# Patient Record
Sex: MALE | Race: BLACK OR AFRICAN AMERICAN | NOT HISPANIC OR LATINO | Employment: FULL TIME | URBAN - METROPOLITAN AREA
[De-identification: names, ages, dates, MRNs, and addresses within clinical notes are randomized per-mention and may not be internally consistent; named-entity substitution may affect disease eponyms.]

---

## 2024-03-28 ENCOUNTER — OFFICE VISIT (OUTPATIENT)
Dept: URGENT CARE | Facility: CLINIC | Age: 29
End: 2024-03-28
Payer: COMMERCIAL

## 2024-03-28 VITALS
HEART RATE: 66 BPM | WEIGHT: 190.69 LBS | OXYGEN SATURATION: 98 % | HEIGHT: 67 IN | DIASTOLIC BLOOD PRESSURE: 71 MMHG | SYSTOLIC BLOOD PRESSURE: 123 MMHG | TEMPERATURE: 99 F | RESPIRATION RATE: 17 BRPM | BODY MASS INDEX: 29.93 KG/M2

## 2024-03-28 DIAGNOSIS — R09.81 NASAL CONGESTION: ICD-10-CM

## 2024-03-28 DIAGNOSIS — R05.9 COUGH, UNSPECIFIED TYPE: ICD-10-CM

## 2024-03-28 DIAGNOSIS — U07.1 COVID-19: Primary | ICD-10-CM

## 2024-03-28 LAB
CTP QC/QA: YES
CTP QC/QA: YES
POC MOLECULAR INFLUENZA A AGN: NEGATIVE
POC MOLECULAR INFLUENZA B AGN: NEGATIVE
SARS-COV-2 AG RESP QL IA.RAPID: POSITIVE

## 2024-03-28 PROCEDURE — 87811 SARS-COV-2 COVID19 W/OPTIC: CPT | Mod: QW,S$GLB,, | Performed by: NURSE PRACTITIONER

## 2024-03-28 PROCEDURE — 99204 OFFICE O/P NEW MOD 45 MIN: CPT | Mod: S$GLB,,, | Performed by: NURSE PRACTITIONER

## 2024-03-28 PROCEDURE — 87502 INFLUENZA DNA AMP PROBE: CPT | Mod: QW,S$GLB,, | Performed by: NURSE PRACTITIONER

## 2024-03-28 RX ORDER — AMOXICILLIN 500 MG/1
500 CAPSULE ORAL 3 TIMES DAILY
COMMUNITY
Start: 2024-03-06 | End: 2024-03-28 | Stop reason: ALTCHOICE

## 2024-03-28 RX ORDER — HYDROCODONE BITARTRATE AND ACETAMINOPHEN 7.5; 325 MG/1; MG/1
1 TABLET ORAL EVERY 6 HOURS PRN
COMMUNITY
Start: 2024-03-06 | End: 2024-03-28 | Stop reason: ALTCHOICE

## 2024-03-28 RX ORDER — BENZONATATE 200 MG/1
200 CAPSULE ORAL 3 TIMES DAILY PRN
Qty: 30 CAPSULE | Refills: 0 | Status: SHIPPED | OUTPATIENT
Start: 2024-03-28 | End: 2024-04-07

## 2024-03-28 RX ORDER — PROMETHAZINE HYDROCHLORIDE AND DEXTROMETHORPHAN HYDROBROMIDE 6.25; 15 MG/5ML; MG/5ML
5 SYRUP ORAL EVERY 8 HOURS PRN
Qty: 180 ML | Refills: 0 | Status: SHIPPED | OUTPATIENT
Start: 2024-03-28

## 2024-03-28 NOTE — PROGRESS NOTES
"Subjective:      Patient ID: Pramod Mendoza is a 28 y.o. male.    Vitals:  height is 5' 7" (1.702 m) and weight is 86.5 kg (190 lb 11.2 oz). His oral temperature is 98.8 °F (37.1 °C). His blood pressure is 123/71 and his pulse is 66. His respiration is 17 and oxygen saturation is 98%.     Chief Complaint: Sinus Problem    Pt is a 27 yo male who presents today w/ nasal congestion accompanied w/ productive cough (yellow sputum) and sore throat for about 2 days. Pt c/o body aches, chills, diarrhea, and body aches. Pt denies fever and emesis. Pt has hx adolescent asthma.  Pt tried ibuprofen with some relief and Delsym with no relief. Denies known sick contacts. Pt is vaccinated.      Sinus Problem  This is a new problem. The current episode started in the past 7 days. The problem is unchanged. There has been no fever. His pain is at a severity of 6/10. The pain is moderate. Associated symptoms include chills, congestion, coughing, headaches and a sore throat. Pertinent negatives include no diaphoresis, ear pain, hoarse voice, neck pain, shortness of breath, sinus pressure, sneezing or swollen glands. (Body aches , diarrhea ) Treatments tried: Ibuprofen, Delsyum. The treatment provided mild relief.       Constitution: Positive for chills. Negative for sweating.   HENT:  Positive for congestion and sore throat. Negative for ear pain and sinus pressure.    Neck: Negative for neck pain.   Respiratory:  Positive for cough. Negative for shortness of breath.    Allergic/Immunologic: Negative for sneezing.   Neurological:  Positive for headaches.      Objective:     Physical Exam   Constitutional: He is oriented to person, place, and time. He appears well-developed. He is cooperative.  Non-toxic appearance. He does not appear ill. No distress.   HENT:   Head: Normocephalic and atraumatic.   Ears:   Right Ear: Hearing, tympanic membrane, external ear and ear canal normal.   Left Ear: Hearing, tympanic membrane, external ear " and ear canal normal.   Nose: Mucosal edema present. No rhinorrhea or nasal deformity. No epistaxis. Right sinus exhibits no maxillary sinus tenderness and no frontal sinus tenderness. Left sinus exhibits no maxillary sinus tenderness and no frontal sinus tenderness.   Mouth/Throat: Uvula is midline and mucous membranes are normal. No trismus in the jaw. Normal dentition. No uvula swelling. Posterior oropharyngeal erythema present. No oropharyngeal exudate or posterior oropharyngeal edema.   Eyes: Conjunctivae and lids are normal. No scleral icterus.   Neck: Trachea normal and phonation normal. Neck supple. No edema present. No erythema present. No neck rigidity present.   Cardiovascular: Normal rate, regular rhythm, normal heart sounds and normal pulses.   Pulmonary/Chest: Effort normal and breath sounds normal. No respiratory distress. He has no decreased breath sounds. He has no wheezes. He has no rhonchi.   Abdominal: Normal appearance.   Musculoskeletal: Normal range of motion.         General: No deformity. Normal range of motion.   Neurological: He is alert and oriented to person, place, and time. He exhibits normal muscle tone. Coordination normal.   Skin: Skin is warm, dry, intact, not diaphoretic and not pale.   Psychiatric: His speech is normal and behavior is normal. Judgment and thought content normal.   Nursing note and vitals reviewed.    Results for orders placed or performed in visit on 03/28/24   POCT Influenza A/B MOLECULAR   Result Value Ref Range    POC Molecular Influenza A Ag Negative Negative, Not Reported    POC Molecular Influenza B Ag Negative Negative, Not Reported     Acceptable Yes    SARS Coronavirus 2 Antigen, POCT Manual Read   Result Value Ref Range    SARS Coronavirus 2 Antigen Positive (A) Negative     Acceptable Yes      COVID risk score:  1    Assessment:     1. COVID-19    2. Nasal congestion    3. Cough, unspecified type        Plan:        COVID-19  -     nirmatrelvir-ritonavir 300 mg (150 mg x 2)-100 mg copackaged tablets (EUA); Take 3 tablets by mouth 2 (two) times daily for 5 days. Each dose contains 2 nirmatrelvir (pink tablets) and 1 ritonavir (white tablet). Take all 3 tablets together  Dispense: 30 tablet; Refill: 0    Nasal congestion  -     POCT Influenza A/B MOLECULAR  -     SARS Coronavirus 2 Antigen, POCT Manual Read    Cough, unspecified type  -     benzonatate (TESSALON) 200 MG capsule; Take 1 capsule (200 mg total) by mouth 3 (three) times daily as needed for Cough.  Dispense: 30 capsule; Refill: 0  -     promethazine-dextromethorphan (PROMETHAZINE-DM) 6.25-15 mg/5 mL Syrp; Take 5 mLs by mouth every 8 (eight) hours as needed (cough).  Dispense: 180 mL; Refill: 0      Patient Instructions   - You must understand that you have received an Urgent Care treatment only and that you may be released before all of your medical problems are known or treated.   - You, the patient, will arrange for follow up care as instructed.   - If your condition worsens or fails to improve we recommend that you receive another evaluation at the ER immediately or contact your PCP to discuss your concerns.   - You can call [phone] or [phone] to help schedule an appointment with the appropriate provider.    Drink plenty of fluids   Get lots of rest  Tylenol or ibuprofen for pain/fever  Mucinex DM for daytime cough  Prescription cough syrup for night time cough. This medication will make you drowsy. Do not drink alcohol or operate machinery while taking this medicine.   Saline nasal rinses to irrigate sinus cavities  Warm salt water gargles for sore throat    Isolation:   Stay home If you have a fever, continue to stay home until you are fever free for over 24 hours without the use of fever reducing medications.  Wear a mask for 5 days.  If you have no symptoms or your symptoms are resolving, you can leave your house

## 2024-03-28 NOTE — PATIENT INSTRUCTIONS
- You must understand that you have received an Urgent Care treatment only and that you may be released before all of your medical problems are known or treated.   - You, the patient, will arrange for follow up care as instructed.   - If your condition worsens or fails to improve we recommend that you receive another evaluation at the ER immediately or contact your PCP to discuss your concerns.   - You can call [phone] or [phone] to help schedule an appointment with the appropriate provider.    Drink plenty of fluids   Get lots of rest  Tylenol or ibuprofen for pain/fever  Mucinex DM for daytime cough  Prescription cough syrup for night time cough. This medication will make you drowsy. Do not drink alcohol or operate machinery while taking this medicine.   Saline nasal rinses to irrigate sinus cavities  Warm salt water gargles for sore throat    Isolation:   Stay home If you have a fever, continue to stay home until you are fever free for over 24 hours without the use of fever reducing medications.  Wear a mask for 5 days.  If you have no symptoms or your symptoms are resolving, you can leave your house

## 2024-03-28 NOTE — LETTER
[address] [place] [id number]  Phone: [phone]  Fax: [phone]          Return to Work/School    Patient: Pramod Mendoza  YOB: 1995   Date: 03/28/2024     To Whom It May Concern:     Pramod Mendoza was in contact with/seen in my office on 03/28/2024. COVID-19 is present in our communities across the [facility]. There is limited testing for COVID at this time, so not all patients can be tested. In this situation, your employee meets the following criteria:     Pramod Mendoza has met the criteria for COVID-19 testing and has a POSITIVE result. He can return to work once they are asymptomatic for 24 hours without the use of fever reducing medications AND at least five days from the start of symptoms (or from the first positive result if they have no symptoms).      If you have any questions or concerns, or if I can be of further assistance, please do not hesitate to contact me.     Sincerely,    Lorrie Gan NP

## 2024-03-28 NOTE — LETTER
[address] [place] [id number]  Phone: [phone]  Fax: [phone]          Return to Work/School    Patient: Pramod Mendoza  YOB: 1995   Date: 03/28/2024     To Whom It May Concern:     Pramod Mendoza was in contact with/seen in my office on 03/28/2024. COVID-19 is present in our communities across the state. There is limited testing for COVID at this time, so not all patients can be tested. In this situation, your employee meets the following criteria:     Pramod Mendoza has met the criteria for COVID-19 testing and has a POSITIVE result. He can return to work once they are asymptomatic for 24 hours without the use of fever reducing medications.      If you have any questions or concerns, or if I can be of further assistance, please do not hesitate to contact me.     Sincerely,    Lorrie Gan NP

## 2024-04-03 ENCOUNTER — OFFICE VISIT (OUTPATIENT)
Dept: URGENT CARE | Facility: CLINIC | Age: 29
End: 2024-04-03
Payer: COMMERCIAL

## 2024-04-03 VITALS
HEIGHT: 67 IN | BODY MASS INDEX: 29.82 KG/M2 | DIASTOLIC BLOOD PRESSURE: 71 MMHG | WEIGHT: 190 LBS | RESPIRATION RATE: 18 BRPM | TEMPERATURE: 99 F | OXYGEN SATURATION: 96 % | HEART RATE: 75 BPM | SYSTOLIC BLOOD PRESSURE: 127 MMHG

## 2024-04-03 DIAGNOSIS — A08.4 VIRAL GASTROENTERITIS: Primary | ICD-10-CM

## 2024-04-03 PROCEDURE — 99212 OFFICE O/P EST SF 10 MIN: CPT | Mod: S$GLB,,,

## 2024-04-03 NOTE — PROGRESS NOTES
"Subjective:      Patient ID: Pramod Mendoza is a 28 y.o. male.    Vitals:  height is 5' 7" (1.702 m) and weight is 86.2 kg (190 lb). His temperature is 98.5 °F (36.9 °C). His blood pressure is 127/71 and his pulse is 75. His respiration is 18 and oxygen saturation is 96%.     Chief Complaint: Cough    Pt presents complaints of a cough, headache, bodyaches and diarrhea 1 week ago. Pt has been taking tessalon pearls and promethazine dm for URI symptoms which have improved. In 1 day pt states about 4-5 times, watery stool, pt states it started to get better until yesterday evening. No abdominal pain. Pt stes he aslo vomited once since symptoms started. Pt reports diarrhea is now more formed. Symptoms exacerbated [place] from [place]     Diarrhea   This is a recurrent problem. The current episode started in the past 7 days. The problem occurs 2 to 4 times per day. The stool consistency is described as Watery. The patient states that diarrhea awakens him from sleep. Associated symptoms include coughing and vomiting. Pertinent negatives include no abdominal pain, arthralgias, bloating, chills, fever, headaches, increased  flatus, myalgias, sweats, URI or weight loss. Nothing aggravates the symptoms. There are no known risk factors. He has tried nothing for the symptoms.       Constitution: Negative for chills, fatigue and fever.   HENT:  Positive for postnasal drip. Negative for congestion.    Cardiovascular:  Negative for chest pain.   Respiratory:  Positive for cough. Negative for shortness of breath.    Gastrointestinal:  Positive for vomiting and diarrhea. Negative for abdominal pain and nausea.   Musculoskeletal:  Negative for joint pain and muscle ache.   Neurological:  Negative for headaches.      Objective:     Physical Exam   Constitutional: He is oriented to person, place, and time. He appears well-developed.   HENT:   Head: Normocephalic and atraumatic.   Ears:   Right Ear: External ear " normal.   Left Ear: External ear normal.   Nose: Nose normal. No rhinorrhea or congestion.   Mouth/Throat: Mucous membranes are normal. Posterior oropharyngeal erythema (mild) present. No oropharyngeal exudate.   Eyes: Conjunctivae and lids are normal.   Neck: Trachea normal. Neck supple.   Cardiovascular: Normal rate, regular rhythm and normal heart sounds.   Pulmonary/Chest: Effort normal and breath sounds normal. No stridor. No respiratory distress. He has no wheezes. He has no rhonchi. He has no rales.   Abdominal: Normal appearance and bowel sounds are normal. He exhibits no distension and no mass. Soft. There is abdominal tenderness in the right upper quadrant. There is no rebound and no guarding.   Musculoskeletal: Normal range of motion.         General: Normal range of motion.   Neurological: He is alert and oriented to person, place, and time. He has normal strength.   Skin: Skin is warm, dry, intact, not diaphoretic and not pale.   Psychiatric: His speech is normal and behavior is normal. Judgment and thought content normal.   Nursing note and vitals reviewed.      Assessment:     1. Viral gastroenteritis        Plan:       Viral gastroenteritis                Discussed results/diagnosis/plan with patient in clinic. Strict precautions given to patient to monitor for worsening signs and symptoms. Advised to follow up with PCP or specialist.  Explained side effects of medications prescribed with patient and informed him/her to discontinue use if he/she has any side effects and to inform UC or PCP if this occurs. All questions answered. Strict ED verses clinic return precautions stressed and given in depth. Advised if symptoms worsens of fail to improve he/she should go to the Emergency Room. Discharge and follow-up instructions given verbally/printed with the patient who expressed understanding and willingness to comply with my recommendations. Patient voiced understanding and in agreement with current  treatment plan. Patient exits the exam room in no acute distress. Conversant and engaged during discharge discussion, verbalized understanding.

## 2024-04-03 NOTE — PATIENT INSTRUCTIONS
Try a bland diet for the next few days. I included information on this in your discharge paperwork. Avoid acidic/spicy/processed foods as this will worsen your symptoms.     You can take over the counter pepto or imodium for symptoms but please understand that this may cause constipation.     Tylenol for pain. Avoid NSAIDs (ibuprofen/advil/aleve) which can cause abdominal pain.     Drink plenty of fluids---water and electrolyte replacement drinks like Pedialyte.    The recommended daily fluid intake for men is 3.7 liters per day (seven 16 oz bottles of water).    Wash hands frequently. Sanitize areas at home. Stay home if you are having fevers, chills, body aches, fatigue. Symptoms should resolve in in 7 days from symptom start. There is no specific treatment for viral illnesses. We treat symptoms with supportive care. Getting plenty of rest but completing light activities daily, drinking plenty of fluids, eating a nutritious diet, and managing stress levels can aid in faster recovery.      Follow up with PCP for ongoing symptoms in a few days.     Go to the ER if your abdominal pain becomes severe, unable to orally hydrate, chest pain, shortness of breath, palpitations, dizziness, large blood loss.

## 2025-03-18 ENCOUNTER — HOSPITAL ENCOUNTER (EMERGENCY)
Facility: OTHER | Age: 30
Discharge: HOME | End: 2025-03-19
Attending: EMERGENCY MEDICINE
Payer: COMMERCIAL

## 2025-03-18 DIAGNOSIS — R51.9 INTERMITTENT HEADACHE: Primary | ICD-10-CM

## 2025-03-18 LAB
HCV AB SERPL QL IA: NEGATIVE
HIV 1+2 AB+HIV1 P24 AG SERPL QL IA: NEGATIVE

## 2025-03-18 PROCEDURE — 99284 EMERGENCY DEPT VISIT MOD MDM: CPT | Mod: 25

## 2025-03-18 PROCEDURE — 86803 HEPATITIS C AB TEST: CPT | Performed by: EMERGENCY MEDICINE

## 2025-03-18 PROCEDURE — 87389 HIV-1 AG W/HIV-1&-2 AB AG IA: CPT | Performed by: EMERGENCY MEDICINE

## 2025-03-18 RX ORDER — BUTALBITAL, ACETAMINOPHEN AND CAFFEINE 50; 325; 40 MG/1; MG/1; MG/1
1 TABLET ORAL
Status: DISCONTINUED | OUTPATIENT
Start: 2025-03-18 | End: 2025-03-18

## 2025-03-19 VITALS
BODY MASS INDEX: 28.25 KG/M2 | WEIGHT: 180 LBS | RESPIRATION RATE: 17 BRPM | SYSTOLIC BLOOD PRESSURE: 122 MMHG | TEMPERATURE: 98 F | HEART RATE: 54 BPM | OXYGEN SATURATION: 100 % | DIASTOLIC BLOOD PRESSURE: 81 MMHG | HEIGHT: 67 IN

## 2025-03-19 NOTE — ED PROVIDER NOTES
Emergency Department Encounter  Provider Note    Pramod Mendoza  [id number]  3/18/2025    Evaluation:    History Acquisition:     Chief Complaint   Patient presents with    Headache     Patient brought to ER by mother for headache , patient states he is undiagnosed for migraines pain on scale 10 with photophobia , No medical history denies nausea denies vomiting         History of Present Illness:  Pramod Mendoza is a 29 y.o. male who has no past medical history on file.    The patient presents to the ED due to headaches.   Patient states he has had headaches all his life.   He states they typically occur once every few months.  He usually takes ibuprofen and they subside.  He has never gotten them formally evaluated, because he felt like they were not that severe.  However, he states over the last several months, he has had more frequent headaches.  Today he had a 10/10 headache associated with photophobia.  It did not respond to his normal dose of ibuprofen.  He took an 800 milligram ibuprofen around 17:00, and states by about 20:00 his headache had subsided.  He denies any associated vision changes, focal weakness/numbness, nausea/vomiting, neck/back pain, or any other associated symptoms.  He wears glasses regularly and had his eyes checked last year.    Additional historians utilized:  none    Prior medical records were reviewed:   Dentist visit 04/2024  Urgent care visit 04/2024 for viral gastroenteritis  Urgent care visit 03/2024 for COVID infection    The patient's list of active medical history, family/social history, medications, and allergies as documented has been reviewed.     History reviewed. No pertinent past medical history.  Past Surgical History:   Procedure Laterality Date    HERNIA REPAIR       No family history on file.  Social History     Socioeconomic History    Marital status: Single   Tobacco Use    Smoking status: Some Days   Substance and Sexual Activity    Alcohol use: No        Medications:  Medication List with Changes/Refills   Current Medications    IBUPROFEN (ADVIL,MOTRIN) 800 MG TABLET    Take 1 tablet (800 mg total) by mouth every 6 (six) hours as needed for Pain (take with food).    PROMETHAZINE-DEXTROMETHORPHAN (PROMETHAZINE-DM) 6.25-15 MG/5 ML SYRP    Take 5 mLs by mouth every 8 (eight) hours as needed (cough).       Allergies:  Review of patient's allergies indicates:   Allergen Reactions    Iodine and iodide containing products          Physical Exam:     Initial Vitals [03/18/25 1926]   BP Pulse Resp Temp SpO2   132/77 60 18 97.6 °F (36.4 °C) 100 %      MAP       --         Physical Exam    Nursing note and vitals reviewed.  Constitutional: He appears well-developed and well-nourished. He is not diaphoretic. No distress.   HENT:   Head: Normocephalic and atraumatic. Mouth/Throat: Oropharynx is clear and moist.   Eyes: EOM are normal. Pupils are equal, round, and reactive to light.   Neck: No tracheal deviation present.   Cardiovascular:  Normal rate, regular rhythm, normal heart sounds and intact distal pulses.           Pulmonary/Chest: Breath sounds normal. No stridor. No respiratory distress.   Abdominal: Abdomen is soft. He exhibits no distension and no mass. There is no abdominal tenderness.   Musculoskeletal:         General: No edema. Normal range of motion.     Neurological: He is alert and oriented to person, place, and time. He has normal strength. He is not disoriented. No cranial nerve deficit or sensory deficit. He exhibits normal muscle tone. GCS eye subscore is 4. GCS verbal subscore is 5. GCS motor subscore is 6.   No focal weakness or acute neuro deficits.   Skin: Skin is warm and dry. Capillary refill takes less than 2 seconds. No rash noted.   Psychiatric: He has a normal mood and affect. His behavior is normal. Thought content normal.         Differential Diagnoses:   Based on available information and initial assessment, Differential Diagnosis  includes, but is not limited to:  Ischemic stroke, hemorrhagic stroke, subarachnoid hemorrhage/ruptured aneurysm, intracranial lesion/mass, meningitis/encephalitis, epidural hematoma, subdural hematoma, pseudotumor cerebri, venous sinus thrombosis, CO poisoning, hypertensive encephalopathy, MI/ACS, head trauma/contusion, concussion, sinus headache, dehydration, anxiety, medication non-compliance, primary headache (tension/cluster/migraine).      ED Management:   Procedures    Orders Placed This Encounter    CT Head Without Contrast    Hepatitis C Antibody    HIV 1/2 Ag/Ab (4th Gen)    Ambulatory referral/consult to Neurology          EKG:       Labs:     Labs Reviewed   HEPATITIS C ANTIBODY       Result Value    Hepatitis C Ab Negative      Narrative:     Release to patient->Immediate   HIV 1 / 2 ANTIBODY    HIV 1/2 Ag/Ab Negative      Narrative:     Release to patient->Immediate     Independent review of the labs ordered include:   See ED course    Imaging:     Imaging Results              CT Head Without Contrast (Final result)  Result time 03/18/25 22:52:02      Final result by Felix Good MD (03/18/25 22:52:02)                   Impression:      No acute intracranial process.  Additional evaluation, as clinically warranted.      Electronically signed by: Felix Good MD  Date:    03/18/2025  Time:    22:52               Narrative:    EXAMINATION:  CT HEAD WITHOUT CONTRAST    CLINICAL HISTORY:  Headache, chronic, new features or increased frequency;    TECHNIQUE:  Low dose axial images were obtained through the head.  Coronal and sagittal reformations were also performed. Contrast was not administered.    COMPARISON:  None.    FINDINGS:  The subcutaneous tissues are unremarkable.  The bony calvarium is intact.  There is mucosal thickening within the ethmoid sinuses.  The mastoid air cells are clear.  The orbits and intraorbital contents are within normal limits.    The craniocervical junction is intact.  The  sellar and parasellar structures are unremarkable.  There are no extra-axial fluid collections.  There is no evidence of intracranial hemorrhage.    The ventricles and sulci are within normal limits.  The cisterns are unremarkable.  The gray-white differentiation is maintained.  There is no dense vessel sign.  There is no evidence of mass effect.                                         Medications Given:     Medications - No data to display       Medical Decision Making:    Additional Consideration:   Additional testing considered during clinical course: labs considered but unlikely to be of benefit given chronic headaches without associated systemic symptoms    Social determinants of health considered during development of treatment plan include: poor access to care    Current co-morbidities considered which impacted clinical decision making: none    Case discussed with additional provider: none    ED Course as of 03/18/25 2327   Tue Mar 18, 2025   2143 SpO2: 100 % [SS]   2143 Resp: 18 [SS]   2143 Pulse: 60 [SS]   2143 Temp Source: Oral [SS]   2143 Temp: 97.6 °F (36.4 °C) [SS]   2143 BP: 132/77  Vitals reassuring, afebrile [SS]   2143 HIV 1/2 Ag/Ab (4th Gen)  Negative [SS]   2143 Hepatitis C Antibody  Negative [SS]   2305 CT Head Without Contrast  CT head independently interpreted: no intracranial hemorrhage, mass effect, or midline shift.  Agree with radiologist interpretation.    [SS]   2305 Patient is well-appearing and reports significant improvement in his headache without intervention in the ED.  CT shows no acute findings.  Will discharge with neurology follow-up.  Return precautions given. [SS]      ED Course User Index  [SS] Jagdeep Lynch MD            Medical Decision Making  After complete evaluation, including thorough history and physical exam, the patient's symptoms are most likely due to primary headache syndrome (including, but not limited to, tension/cluster/migraine headache). The patient's  headaches appear to be chronic and intermittent. The history does not suggest sudden/maximal onset of pain consistent with SAH/intracranial bleed. Physical exam is benign without focal weakness, sensory deficit, or cerebellar signs to suggest stroke or intracranial mass. There is no meningismus, fever, or evidence of infection to suggest meningitis/encephalitis. The patient was monitored and improved on reassessment. Neurology referral placed for further workup and management.       Problems Addressed:  Intermittent headache: acute illness or injury    Amount and/or Complexity of Data Reviewed  Labs: ordered. Decision-making details documented in ED Course.  Radiology: ordered and independent interpretation performed. Decision-making details documented in ED Course.    Risk  OTC drugs.  Diagnosis or treatment significantly limited by social determinants of health.        Clinical Impression:       ICD-10-CM ICD-9-CM   1. Intermittent headache  R51.9 784.0         Follow-up Information       Follow up With Specialties Details Why Contact Info Additional Information    [facility] Neurology Schedule an appointment as soon as possible for a visit   [address], [address]  [place] [address] [phone] Turn at Entrance 1 on [address] in [facility], [place] and take elevators to Floor 2. Follow signs to [facility]. Take [place] [address] to Floor 5 for [address].             ED Disposition Condition    Discharge Stable              On re-evaluation, the patient's status has improved.  PCP/Neuro follow-up as soon as possible was recommended.    After taking into careful account the patient's history, physical exam findings, as well as empirical and objective data obtained throughout ED workup, I feel no emergent medical condition has been identified. No further evaluation or admission was felt to be required, and the patient is stable for discharge from the ED. The patient and  any additional family present were updated with test results, overall clinical impression, and recommended further plan of care, including discharge instructions as provided and outpatient follow-up for continued evaluation and management as needed. All questions were answered. The patient expressed understanding and agreed with current plan for discharge and follow-up plan of care. Strict ED return precautions were provided, including return/worsening of current symptoms, new symptoms, or any other concerns.       Jagdeep Lynch MD  03/18/25 [id number]

## 2025-03-19 NOTE — FIRST PROVIDER EVALUATION
Emergency Department TeleTriage Encounter Note      CHIEF COMPLAINT    Chief Complaint   Patient presents with    Headache     Patient brought to ER by mother for headache , patient states he is undiagnosed for migraines pain on scale 10 with photophobia , No medical history denies nausea denies vomiting         VITAL SIGNS   Initial Vitals [03/18/25 1926]   BP Pulse Resp Temp SpO2   132/77 60 18 97.6 °F (36.4 °C) 100 %      MAP       --            ALLERGIES    Review of patient's allergies indicates:   Allergen Reactions    Iodine and iodide containing products        PROVIDER TRIAGE NOTE  Patient presents with photophobia, headache to the right side of the head. He hash had headaches like this in the past. No dx of migraines yet. He took 800mg ibuprofen which helped some. No recent head trauma. No focal neuro deficits.      ORDERS  Labs Reviewed   HEPATITIS C ANTIBODY   HIV 1 / 2 ANTIBODY       ED Orders (720h ago, onward)      Start Ordered     Status Ordering Provider    03/18/25 1929 03/18/25 1929  Hepatitis C Antibody  STAT         Ordered CARLOS LOOMIS    03/18/25 1929 03/18/25 1929  HIV 1/2 Ag/Ab (4th Gen)  STAT         Ordered CARLOS LOOMIS              Virtual Visit Note: The provider triage portion of this emergency department evaluation and documentation was performed via [employer], a HIPAA-compliant telemedicine application, in concert with a tele-presenter in the room. A face to face patient evaluation with one of my colleagues will occur once the patient is placed in an emergency department room.      DISCLAIMER: This note was prepared with [employer] voice recognition transcription software. Garbled syntax, mangled pronouns, and other bizarre constructions may be attributed to that software system.

## 2025-03-19 NOTE — DISCHARGE INSTRUCTIONS
Thank you for choosing [facility]!     Our goal in the Emergency Department is to always provide outstanding medical care. You may receive a survey by mail or e-mail in the next week regarding your experience today. We would greatly appreciate you completing and returning the survey. Your feedback provides us with a way to recognize our staff who provide very good care, and it helps us learn how to improve when your experience was below our aspiration of excellence.      It is important to remember that some problems are difficult to diagnose and may not be found during your first visit. Be sure to follow up with your primary care doctor and review any labs/imaging that was performed during your visit with them. If you do not have a primary care doctor, you may contact the one listed on your discharge paperwork, or you may also call the [facility] Appointment Desk at [phone] to schedule an appointment.     All medications may potentially have side effects and it is impossible to predict which medications may give you side effects. If you feel that you are having a negative effect of any medication you should immediately stop taking them and seek medical attention.  Do not drive or make any important decisions for 24 hours if you have received any pain medications, sedatives or mood altering drugs during your ER visit.    We appreciate you trusting us with your medical care. We will be happy to take care of you for all of your future medical needs. You may return to the ER at any time for any new/concerning symptoms, worsening condition, or failure to improve. We hope you feel better soon.     Sincerely,    Jagdeep Lynch Jr., MD  Board-Certified Emergency Medicine Physician  [facility]